# Patient Record
(demographics unavailable — no encounter records)

---

## 2017-07-14 NOTE — ER DOCUMENT REPORT
ED Skin Rash/Insect Bite/Abscs





- General


Chief Complaint: Skin Problem


Stated Complaint: POSSIBLE SPIDER BITE/LEFT FOOT


Time Seen by Provider: 07/14/17 15:12


Mode of Arrival: Ambulatory


Notes: 


60-year-old male presents to ED for pain and swelling to his left foot.  He 

states he got bit by a spider 4 days ago.


TRAVEL OUTSIDE OF THE [place] IN LAST 30 DAYS: No





- HPI


Patient complains to provider of: Spider bite


Onset: Other - 4 Days ago


Onset/Duration: Gradual, Worse


Quality of pain: Sharp


Severity: Moderate


Pain Level: 3


Skin Character: Other - Bite to left foot, small fluid filled blister to the 

area and no ulcers, erythema or inflammation to the area.  bruising noted 

around the toes and back of the foot


Quality of rash: Painful


Identify cause: Yes


Exacerbated by: Movement, Walking


Relieved by: Denies


Similar symptoms previously: Yes


Recently seen / treated by doctor: Yes





- Related Data


Allergies/Adverse Reactions: 


 





Penicillins Allergy (Verified 07/14/17 14:33)


 











Past Medical History





- General


Information source: Patient





- Social History


Smoking Status: Current Every Day Smoker


Cigarette use (# per day): Yes


Chew tobacco use (# tins/day): No


Smoking Education Provided: Yes - less than 2 min


Frequency of alcohol use: Heavy


Drug Abuse: None


Family History: Reviewed & Not Pertinent


Patient has suicidal ideation: No


Patient has homicidal ideation: No





- Past Medical History


Cardiac Medical History: Reports: None


Pulmonary Medical History: Reports: None


EENT Medical History: Reports: None


Neurological Medical History: Reports: None


Endocrine Medical History: Reports: None


Renal/ Medical History: Reports: None


Malignancy Medical History: Reports None


GI Medical History: Reports: None


Musculoskeltal Medical History: Reports Hx Arthritis, Reports Hx 

Musculoskeletal Deformity, Reports Hx Musculoskeletal Trauma


Skin Medical History: Reports None


Psychiatric Medical History: Reports: None


Traumatic Medical History: Reports: None


Infectious Medical History: Reports: None


Past Surgical History: Reports: Other - cataract one eye needs other next week





- Immunizations


Hx Diphtheria, Pertussis, Tetanus Vaccination: No





Review of Systems





- Review of Systems


Constitutional: No symptoms reported


EENT: No symptoms reported


Cardiovascular: No symptoms reported


Respiratory: No symptoms reported


Gastrointestinal: No symptoms reported


Genitourinary: No symptoms reported


Male Genitourinary: No symptoms reported


Musculoskeletal: No symptoms reported


Skin: Other - Spider bite to left foot, small fluid filled blister to the area 

and no ulcers, erythema or inflammation to the area.  bruising noted around the 

toes and back of the foot


Hematologic/Lymphatic: No symptoms reported


Neurological/Psychological: No symptoms reported


-: Yes All other systems reviewed and negative





Physical Exam





- Vital signs


Vitals: 


 











Temp Pulse Resp BP Pulse Ox


 


 98.3 F   77   18   160/85 H  92 


 


 07/14/17 14:35  07/14/17 14:35  07/14/17 14:35  07/14/17 14:35  07/14/17 14:35











Interpretation: Normal





- General


General appearance: Appears well, Alert





- HEENT


Head: Normocephalic, Atraumatic


Eyes: Normal


Pupils: PERRL





- Respiratory


Respiratory status: No respiratory distress


Chest status: Nontender


Breath sounds: Normal


Chest palpation: Normal





- Cardiovascular


Rhythm: Regular


Heart sounds: Normal auscultation


Murmur: No





- Abdominal


Inspection: Normal


Distension: No distension


Bowel sounds: Normal


Tenderness: Nontender


Organomegaly: No organomegaly





- Back


Back: Normal, Nontender





- Extremities


General upper extremity: Normal inspection, Nontender, Normal color, Normal ROM

, Normal temperature


General lower extremity: Normal ROM, Normal temperature, Normal weight bearing.

  No: Lindsay's sign


Foot: Tender, Ecchymosis, Edema, No evidence of FB, Other - spider bite to top 

of foot.  No: Abrasion, Deformity, Instability, Laceration, Metatarsal 

compress. pain, Nail injury, Navicular tenderness, Puncture wound, Tender 5th 

metatarsal





- Neurological


Neuro grossly intact: Yes


Cognition: Normal


Orientation: AAOx4


[place] Coma Scale Eye Opening: Spontaneous


[place] Coma Scale Verbal: Oriented


[place] Coma Scale Motor: Obeys Commands


[place] Coma Scale Total: 15


Speech: Normal


Motor strength normal: LUE, RUE, LLE, RLE


Sensory: Normal





- Psychological


Associated symptoms: Normal affect, Normal mood





- Skin


Skin Temperature: Warm


Skin Moisture: Dry


Skin Color: Normal, Ecchymosis


Location of irregularity: Extremities -  Spider bite to left foot, small fluid 

filled blister to the area and no ulcers, erythema or inflammation to the area.

  bruising noted around the toes and back of the foot


Character of irregularity: Papular


Irregularity with: Swelling, Tenderness





Course





- Vital Signs


Vital signs: 


 











Temp Pulse Resp BP Pulse Ox


 


 98.3 F   82   18   153/77 H  94 


 


 07/14/17 14:35  07/14/17 15:38  07/14/17 15:38  07/14/17 15:38  07/14/17 15:38














Discharge





- Discharge


Clinical Impression: 


 Spider bite left foot 4 days ago





Condition: Stable


Disposition: HOME, SELF-CARE


Instructions:  Family Physicians / Practices


Additional Instructions: 


He states he was bit by a spider 4 days ago.  You have bruising and swelling to 

your left foot.  The study he brought in the jar is a worse pattern not a brown 

recluse.  There is no treatment for a with a spider bite except for elevation 

ice and ibuprofen.








Ibuprofen





     Ibuprofen is an excellent, safe drug for pain control.  In addition, it 

has potent antiinflammatory effects which are beneficial, especially in the 

treatment of injuries, arthritis, or tendonitis. It's best to take ibuprofen 

with food.  Persons with ulcer disease or allergy to aspirin should notify 

their physician of this before taking ibuprofen.


     Take the medication exactly as prescribed.  Don't take additional doses 

unless instructed to do so by your doctor.  If you develop wheezing, shortness 

of breath, hives, faintness, stomach pain, vomiting, or dark black stools, 

return for re-evaluation at once.





Ice & Elevation





     Apply ice packs frequently against the painful area.  Many different 

schedules are recommended, such as "20 minutes on, 20 minutes off" or "one hour 

ice, two hours rest."  If you need to work, you may need to go longer between 

ice treatments.  You should plan to have the area ice packed AT LEAST one-

fourth of the time.


     The ice should be applied over the wrap, tape, or splint, or over a layer 

of cloth -- not directly against the skin.  Some ice bags have a built-in cloth 

and can be put directly on the skin.


     Your injured part should be elevated as much as possible over the next 48 

hours.  Try to keep the injury above the level of the heart. Avoid use of the 

injured area.  Elevation and rest will decrease the swelling.





FOLLOW-UP CARE:


If you have been referred to a physician for follow-up care, call the physician

s office for an appointment as you were instructed or within the next two days.

  If you experience worsening or a significant change in your symptoms, notify 

the physician immediately or return to the Emergency Department at any time for 

re-evaluation.


Forms:  Elevated Blood Pressure

## 2017-08-17 NOTE — ER DOCUMENT REPORT
ED Wound





- General


Chief Complaint: Laceration


Stated Complaint: ARM INJURY


Time Seen by Provider: 08/17/17 13:59


Mode of Arrival: Ambulatory


Information source: Patient


Notes: 





60-year-old male presents to ED for laceration to the right forearm on a piece 

of metal.  He states his laceration was last night about 9 PM.  States he also 

injured his back when he jumped back has a little bit of pain in the left low 

back.


TRAVEL OUTSIDE OF THE [place] IN LAST 30 DAYS: No





- HPI


Patient complains to provider of: Laceration


Occurred: Yesterday


Onset/Duration: Gradual


Quality of pain: Sharp


Severity: Mild


Pain Level: 2


Context: Injury


Sensations intact: Yes


Distal pulses present: Yes


Associated Symptoms: Foreign body - Possible foreign body in the laceration, 

Other - Patient also states that he went to his back when he jumped back





- Related Data


Allergies/Adverse Reactions: 


 





Penicillins Allergy (Verified 08/17/17 13:34)


 











Past Medical History





- General


Information source: Patient





- Social History


Smoking Status: Current Every Day Smoker


Cigarette use (# per day): Yes - 1.5 ppd


Chew tobacco use (# tins/day): No


Smoking Education Provided: Yes - less than 2min


Frequency of alcohol use: Heavy - 4-5 beer at least qod


Drug Abuse: None


Occupation: sales


Lives with: Friend


Family History: Reviewed & Not Pertinent





- Medical History


Medical History: Other - porphyria





- Past Medical History


Cardiac Medical History: Reports: Hx Hypertension


Pulmonary Medical History: Reports: None


EENT Medical History: Reports: Eyes


Other: 





Bilateral cataract surgery


Neurological Medical History: Reports: None


Endocrine Medical History: Reports: None


Renal/ Medical History: Reports: None


Malignancy Medical History: Reports None


GI Medical History: Reports: None


Musculoskeltal Medical History: Reports Hx Arthritis, Reports Hx 

Musculoskeletal Deformity, Reports Hx Musculoskeletal Trauma


Skin Medical History: Reports None


Psychiatric Medical History: Reports: None


Traumatic Medical History: Reports: Hx Fractures - Bilateral tibia, fingers, 

elbow, and L5., Hx Spine Fracture - L5


Infectious Medical History: Reports: None


Past Surgical History: Reports: Other - cataract both eyes





- Immunizations


Hx Diphtheria, Pertussis, Tetanus Vaccination: No





Review of Systems





- Review of Systems


Constitutional: No symptoms reported


EENT: No symptoms reported


Cardiovascular: No symptoms reported


Respiratory: No symptoms reported


Gastrointestinal: No symptoms reported


Genitourinary: No symptoms reported


Male Genitourinary: No symptoms reported


Musculoskeletal: Back pain - Left lower back


Skin: Other - Laceration to right forearm


Hematologic/Lymphatic: No symptoms reported


Neurological/Psychological: No symptoms reported





Physical Exam





- Vital signs


Vitals: 


 











Temp Pulse Resp BP Pulse Ox


 


 98.5 F   77   20   151/86 H  95 


 


 08/17/17 13:31  08/17/17 13:31  08/17/17 13:31  08/17/17 13:31  08/17/17 13:31











Interpretation: Normal





- General


General appearance: Appears well, Alert





- HEENT


Head: Normocephalic, Atraumatic


Eyes: Normal


Pupils: PERRL





- Respiratory


Respiratory status: No respiratory distress


Chest status: Nontender


Breath sounds: Normal


Chest palpation: Normal





- Cardiovascular


Rhythm: Regular


Heart sounds: Normal auscultation


Murmur: No





- Abdominal


Inspection: Normal


Distension: No distension


Bowel sounds: Normal


Tenderness: Nontender


Organomegaly: No organomegaly





- Back


Back: Normal, Tender - Left lower back.  No: Deformity/step-off, CVA tenderness

, Vertebra tenderness, Scars, Scoliosis, Wounds





- Extremities


General upper extremity: Normal inspection, Nontender, Normal color, Normal ROM

, Normal temperature


General lower extremity: Normal inspection, Nontender, Normal color, Normal ROM

, Normal temperature, Normal weight bearing.  No: Lindsay's sign





- Neurological


Neuro grossly intact: Yes


Cognition: Normal


Orientation: AAOx4


[place] Coma Scale Eye Opening: Spontaneous


[place] Coma Scale Verbal: Oriented


[place] Coma Scale Motor: Obeys Commands


[place] Coma Scale Total: 15


Speech: Normal


Motor strength normal: LUE, RUE, LLE, RLE


Sensory: Normal





- Psychological


Associated symptoms: Normal affect, Normal mood





- Skin


Skin Temperature: Warm


Skin Moisture: Dry


Skin Color: Normal


Skin irregularity: Laceration


Location of irregularity: Extremities - Right forearm approximately  1/2 inches


Irregularity with: Tenderness





Course





- Vital Signs


Vital signs: 


 











Temp Pulse Resp BP Pulse Ox


 


 98.6 F   82   18   158/88 H  100 


 


 08/17/17 15:40  08/17/17 15:40  08/17/17 15:40  08/17/17 15:40  08/17/17 15:40














Procedures





- Laceration/Wound Repair


  ** Right forearm


Wound length (cm): 4.5


Wound's Depth, Shape: Into muscle, Irregular


Laceration pre-procedure: Sterile PPE donned, Sterile drapes applied, Other


Anesthetic type: 1% Lidocaine


Volume Anesthetic (mLs): 5


Wound explored: Contaminated


Irrigated w/ Saline (mLs): 500


Wound Repaired With: Steri-strips - wound 20 hours old cannot close with 

sutured. reduced size of opening with steri strips


Layer Closure?: No





Discharge





- Discharge


Clinical Impression: 


Laceration of right forearm with foreign body


Qualifiers:


 Encounter type: initial encounter Qualified Code(s): S51.821A - Laceration 

with foreign body of right forearm, initial encounter





Condition: Stable


Disposition: HOME, SELF-CARE


Instructions:  Family Physicians / Practices


Additional Instructions: 


Care of Steri-Strip Closure





     Your cut has been closed up with a special surgical tape.  For this type 

of cut, it can replace stitches.  You must protect the wound just as you would 

with stitches, however.


     For the first few days, keep the wound area completely dry.  This also 

means you should avoid activity which makes you sweat.  Do not move the area if 

motion stretches or wrinkles the strips.  Don't allow the area to be bumped -- 

if bleeding occurs, the blood can make the strips loosen.


     The strips are somewhat waterproof.  After a few days, the physician may 

allow you to shower.  Be sure to ask if it's OK.


     Do not remove the tape until it peels off by itself.  At that time, the 

wound should be healed.





SOAP CLEANSING:


     Gently wash the wound daily using a mild soap (like Ivory, Phisoderm, 

Neutrogena).    Allow to dry briefly (about 10 minutes) after cleaning.  Repeat 

this cleansing at least three times a day for the first two days and then once 

or twice a day.





TETANUS IMMUNIZATION GIVEN:


     You have been given an immunization against tetanus.  Please record this 

in your records.  In general, a booster is needed only once every 10 years.  

The tetanus shot protects against tetanus or "lockjaw," which is a complication 

of certain wound infections (the tetanus shot cannot protect against the actual 

infection).


     The immunization site may become warm and red due to local reaction.  If 

this occurs, apply warm compresses and take aspirin or ibuprofen to reduce 

inflammation and discomfort.  Return for evaluation if the reaction becomes 

severe.





Cephalexin





     The antibiotic you've been prescribed is a member of the cephalosporin 

class.  This type of antibiotic covers a wide variety of infections, including 

those of the skin, lungs, and urinary tract. It's useful for staph infections.


     This antibiotic is slightly similar to the penicillin family. In rare cases

, a person who is allergic to penicillin will also be allergic to this 

medication.  If you have had a severe allergic reaction to penicillin, and have 

not taken this antibiotic since that time, notify your doctor.


     Antibiotics which cover many germs ("broad spectrum" antibiotics) are more 

likely to cause diarrhea or "yeast" infections.  Women prone to vaginal yeast 

problems may suffer an attack after taking this antibiotic.  In infants, oral 

thrush (white spots "stuck" on the cheek) or yeast diaper rash may result.  See 

your doctor if these problems occur.  Call at once if you develop itching, hives

, shortness of breath, or lightheadedness.











FOLLOW-UP CARE:


If you have been referred to a physician for follow-up care, call the physician

s office for an appointment as you were instructed or within the next two days.

  If you experience worsening or a significant change in your symptoms, notify 

the physician immediately or return to the Emergency Department at any time for 

re-evaluation.


Prescriptions: 


Cephalexin Monohydrate [Keflex 500 mg Capsule] 500 mg PO Q6H 5 Days  capsule


Forms:  Elevated Blood Pressure, Smoking Cessation Education